# Patient Record
Sex: MALE | Race: WHITE | NOT HISPANIC OR LATINO | Employment: UNEMPLOYED | URBAN - METROPOLITAN AREA
[De-identification: names, ages, dates, MRNs, and addresses within clinical notes are randomized per-mention and may not be internally consistent; named-entity substitution may affect disease eponyms.]

---

## 2023-02-06 ENCOUNTER — APPOINTMENT (OUTPATIENT)
Dept: CT IMAGING | Facility: HOSPITAL | Age: 28
End: 2023-02-06
Payer: MEDICAID

## 2023-02-06 ENCOUNTER — HOSPITAL ENCOUNTER (EMERGENCY)
Facility: HOSPITAL | Age: 28
Discharge: HOME | End: 2023-02-06
Attending: EMERGENCY MEDICINE | Admitting: EMERGENCY MEDICINE
Payer: MEDICAID

## 2023-02-06 VITALS
OXYGEN SATURATION: 98 % | SYSTOLIC BLOOD PRESSURE: 116 MMHG | HEART RATE: 87 BPM | WEIGHT: 315 LBS | RESPIRATION RATE: 16 BRPM | TEMPERATURE: 98.6 F | HEIGHT: 71 IN | BODY MASS INDEX: 44.1 KG/M2 | DIASTOLIC BLOOD PRESSURE: 66 MMHG

## 2023-02-06 DIAGNOSIS — S83.91XA SPRAIN OF RIGHT KNEE, UNSPECIFIED LIGAMENT, INITIAL ENCOUNTER: Primary | ICD-10-CM

## 2023-02-06 DIAGNOSIS — K92.1 HEMATOCHEZIA: ICD-10-CM

## 2023-02-06 LAB
ALBUMIN SERPL-MCNC: 3.8 G/DL (ref 3.5–5.2)
ALBUMIN/GLOB SERPL: 1.2 G/DL
ALP SERPL-CCNC: 85 U/L (ref 39–117)
ALT SERPL W P-5'-P-CCNC: 37 U/L (ref 1–41)
ANION GAP SERPL CALCULATED.3IONS-SCNC: 8.3 MMOL/L (ref 5–15)
AST SERPL-CCNC: 30 U/L (ref 1–40)
BASOPHILS # BLD AUTO: 0.02 10*3/MM3 (ref 0–0.2)
BASOPHILS NFR BLD AUTO: 0.4 % (ref 0–1.5)
BILIRUB SERPL-MCNC: 0.2 MG/DL (ref 0–1.2)
BILIRUB UR QL STRIP: NEGATIVE
BUN SERPL-MCNC: 10 MG/DL (ref 6–20)
BUN/CREAT SERPL: 14.1 (ref 7–25)
CALCIUM SPEC-SCNC: 9.6 MG/DL (ref 8.6–10.5)
CHLORIDE SERPL-SCNC: 101 MMOL/L (ref 98–107)
CLARITY UR: CLEAR
CO2 SERPL-SCNC: 25.7 MMOL/L (ref 22–29)
COLOR UR: YELLOW
CREAT SERPL-MCNC: 0.71 MG/DL (ref 0.76–1.27)
DEPRECATED RDW RBC AUTO: 44.2 FL (ref 37–54)
EGFRCR SERPLBLD CKD-EPI 2021: 128.2 ML/MIN/1.73
EOSINOPHIL # BLD AUTO: 0.22 10*3/MM3 (ref 0–0.4)
EOSINOPHIL NFR BLD AUTO: 4 % (ref 0.3–6.2)
ERYTHROCYTE [DISTWIDTH] IN BLOOD BY AUTOMATED COUNT: 14.2 % (ref 12.3–15.4)
GLOBULIN UR ELPH-MCNC: 3.2 GM/DL
GLUCOSE SERPL-MCNC: 91 MG/DL (ref 65–99)
GLUCOSE UR STRIP-MCNC: NEGATIVE MG/DL
HCT VFR BLD AUTO: 42.6 % (ref 37.5–51)
HEMOCCULT STL QL IA: POSITIVE
HGB BLD-MCNC: 14 G/DL (ref 13–17.7)
HGB UR QL STRIP.AUTO: NEGATIVE
HOLD SPECIMEN: NORMAL
HOLD SPECIMEN: NORMAL
IMM GRANULOCYTES # BLD AUTO: 0.01 10*3/MM3 (ref 0–0.05)
IMM GRANULOCYTES NFR BLD AUTO: 0.2 % (ref 0–0.5)
KETONES UR QL STRIP: NEGATIVE
LEUKOCYTE ESTERASE UR QL STRIP.AUTO: NEGATIVE
LIPASE SERPL-CCNC: 18 U/L (ref 13–60)
LYMPHOCYTES # BLD AUTO: 2.33 10*3/MM3 (ref 0.7–3.1)
LYMPHOCYTES NFR BLD AUTO: 42.4 % (ref 19.6–45.3)
MCH RBC QN AUTO: 27.9 PG (ref 26.6–33)
MCHC RBC AUTO-ENTMCNC: 32.9 G/DL (ref 31.5–35.7)
MCV RBC AUTO: 85 FL (ref 79–97)
MONOCYTES # BLD AUTO: 0.7 10*3/MM3 (ref 0.1–0.9)
MONOCYTES NFR BLD AUTO: 12.8 % (ref 5–12)
NEUTROPHILS NFR BLD AUTO: 2.21 10*3/MM3 (ref 1.7–7)
NEUTROPHILS NFR BLD AUTO: 40.2 % (ref 42.7–76)
NITRITE UR QL STRIP: NEGATIVE
NRBC BLD AUTO-RTO: 0 /100 WBC (ref 0–0.2)
PH UR STRIP.AUTO: 7 [PH] (ref 5–8)
PLATELET # BLD AUTO: 356 10*3/MM3 (ref 140–450)
PMV BLD AUTO: 9.5 FL (ref 6–12)
POTASSIUM SERPL-SCNC: 4.4 MMOL/L (ref 3.5–5.2)
PROT SERPL-MCNC: 7 G/DL (ref 6–8.5)
PROT UR QL STRIP: NEGATIVE
RBC # BLD AUTO: 5.01 10*6/MM3 (ref 4.14–5.8)
SODIUM SERPL-SCNC: 135 MMOL/L (ref 136–145)
SP GR UR STRIP: 1.01 (ref 1–1.03)
UROBILINOGEN UR QL STRIP: NORMAL
WBC NRBC COR # BLD: 5.49 10*3/MM3 (ref 3.4–10.8)
WHOLE BLOOD HOLD COAG: NORMAL
WHOLE BLOOD HOLD SPECIMEN: NORMAL

## 2023-02-06 PROCEDURE — 80053 COMPREHEN METABOLIC PANEL: CPT | Performed by: EMERGENCY MEDICINE

## 2023-02-06 PROCEDURE — 36415 COLL VENOUS BLD VENIPUNCTURE: CPT

## 2023-02-06 PROCEDURE — 74177 CT ABD & PELVIS W/CONTRAST: CPT

## 2023-02-06 PROCEDURE — 99283 EMERGENCY DEPT VISIT LOW MDM: CPT

## 2023-02-06 PROCEDURE — 83690 ASSAY OF LIPASE: CPT | Performed by: EMERGENCY MEDICINE

## 2023-02-06 PROCEDURE — 81003 URINALYSIS AUTO W/O SCOPE: CPT | Performed by: EMERGENCY MEDICINE

## 2023-02-06 PROCEDURE — 82274 ASSAY TEST FOR BLOOD FECAL: CPT | Performed by: EMERGENCY MEDICINE

## 2023-02-06 PROCEDURE — 85025 COMPLETE CBC W/AUTO DIFF WBC: CPT | Performed by: EMERGENCY MEDICINE

## 2023-02-06 PROCEDURE — 0 IOPAMIDOL PER 1 ML: Performed by: EMERGENCY MEDICINE

## 2023-02-06 RX ORDER — DOCUSATE SODIUM 100 MG/1
100 CAPSULE, LIQUID FILLED ORAL 2 TIMES DAILY
Qty: 60 CAPSULE | Refills: 0 | Status: SHIPPED | OUTPATIENT
Start: 2023-02-06 | End: 2023-03-08

## 2023-02-06 RX ORDER — QUETIAPINE FUMARATE 100 MG/1
100 TABLET, FILM COATED ORAL NIGHTLY
COMMUNITY

## 2023-02-06 RX ORDER — PRAZOSIN HYDROCHLORIDE 2 MG/1
3 CAPSULE ORAL NIGHTLY
COMMUNITY

## 2023-02-06 RX ORDER — LISINOPRIL 20 MG/1
20 TABLET ORAL DAILY
COMMUNITY

## 2023-02-06 RX ORDER — BUPRENORPHINE HYDROCHLORIDE AND NALOXONE HYDROCHLORIDE DIHYDRATE 8; 2 MG/1; MG/1
2 TABLET SUBLINGUAL DAILY
COMMUNITY

## 2023-02-06 RX ORDER — DOCUSATE SODIUM 100 MG/1
100 CAPSULE, LIQUID FILLED ORAL 2 TIMES DAILY
Qty: 60 CAPSULE | Refills: 0 | Status: SHIPPED | OUTPATIENT
Start: 2023-02-06 | End: 2023-02-06 | Stop reason: SDUPTHER

## 2023-02-06 RX ORDER — SODIUM CHLORIDE 0.9 % (FLUSH) 0.9 %
10 SYRINGE (ML) INJECTION AS NEEDED
Status: DISCONTINUED | OUTPATIENT
Start: 2023-02-06 | End: 2023-02-06 | Stop reason: HOSPADM

## 2023-02-06 RX ORDER — BUPROPION HYDROCHLORIDE 150 MG/1
300 TABLET, EXTENDED RELEASE ORAL DAILY
COMMUNITY

## 2023-02-06 RX ADMIN — IOPAMIDOL 100 ML: 755 INJECTION, SOLUTION INTRAVENOUS at 11:58
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
## 2023-02-16 ENCOUNTER — HOSPITAL ENCOUNTER (EMERGENCY)
Facility: HOSPITAL | Age: 28
Discharge: HOME | End: 2023-02-16
Attending: EMERGENCY MEDICINE | Admitting: EMERGENCY MEDICINE
Payer: MEDICAID

## 2023-02-16 VITALS
HEIGHT: 71 IN | HEART RATE: 91 BPM | OXYGEN SATURATION: 100 % | SYSTOLIC BLOOD PRESSURE: 141 MMHG | TEMPERATURE: 97.9 F | RESPIRATION RATE: 20 BRPM | WEIGHT: 315 LBS | DIASTOLIC BLOOD PRESSURE: 76 MMHG | BODY MASS INDEX: 44.1 KG/M2

## 2023-02-16 DIAGNOSIS — J10.1 INFLUENZA B: Primary | ICD-10-CM

## 2023-02-16 LAB
FLUAV AG NPH QL: NEGATIVE
FLUBV AG NPH QL IA: POSITIVE
S PYO AG THROAT QL: NEGATIVE
SARS-COV-2 RNA RESP QL NAA+PROBE: NOT DETECTED

## 2023-02-16 PROCEDURE — 87147 CULTURE TYPE IMMUNOLOGIC: CPT | Performed by: EMERGENCY MEDICINE

## 2023-02-16 PROCEDURE — 87081 CULTURE SCREEN ONLY: CPT | Performed by: EMERGENCY MEDICINE

## 2023-02-16 PROCEDURE — C9803 HOPD COVID-19 SPEC COLLECT: HCPCS

## 2023-02-16 PROCEDURE — U0004 COV-19 TEST NON-CDC HGH THRU: HCPCS

## 2023-02-16 PROCEDURE — C9803 HOPD COVID-19 SPEC COLLECT: HCPCS | Performed by: EMERGENCY MEDICINE

## 2023-02-16 PROCEDURE — 87804 INFLUENZA ASSAY W/OPTIC: CPT

## 2023-02-16 PROCEDURE — 87880 STREP A ASSAY W/OPTIC: CPT

## 2023-02-16 PROCEDURE — 99283 EMERGENCY DEPT VISIT LOW MDM: CPT
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
## 2023-02-16 NOTE — DISCHARGE INSTRUCTIONS
Drink plenty of fluids, rest, take OTC tylenol/ibuprofen as needed for headache/body aches/fever.   You can take over-the-counter cold and flu medicine as needed    It is a virus, will need to run its course

## 2023-02-18 ENCOUNTER — TELEPHONE (OUTPATIENT)
Dept: EMERGENCY DEPT | Facility: HOSPITAL | Age: 28
End: 2023-02-18
Payer: MEDICAID

## 2023-02-18 LAB — BACTERIA SPEC AEROBE CULT: ABNORMAL

## 2023-02-18 NOTE — TELEPHONE ENCOUNTER
Attempted phone call to discuss results of recent throat culture.  Unable to leave voicemail.  There was no answer.

## 2023-04-27 ENCOUNTER — OFFICE VISIT (OUTPATIENT)
Dept: FAMILY MEDICINE CLINIC | Facility: CLINIC | Age: 28
End: 2023-04-27
Payer: MEDICAID

## 2023-04-27 ENCOUNTER — LAB (OUTPATIENT)
Dept: LAB | Facility: HOSPITAL | Age: 28
End: 2023-04-27
Payer: MEDICAID

## 2023-04-27 VITALS
HEART RATE: 89 BPM | OXYGEN SATURATION: 98 % | DIASTOLIC BLOOD PRESSURE: 90 MMHG | WEIGHT: 315 LBS | BODY MASS INDEX: 44.1 KG/M2 | HEIGHT: 71 IN | SYSTOLIC BLOOD PRESSURE: 130 MMHG

## 2023-04-27 DIAGNOSIS — F32.A DEPRESSION, UNSPECIFIED DEPRESSION TYPE: ICD-10-CM

## 2023-04-27 DIAGNOSIS — E66.01 CLASS 3 SEVERE OBESITY DUE TO EXCESS CALORIES WITH SERIOUS COMORBIDITY AND BODY MASS INDEX (BMI) OF 45.0 TO 49.9 IN ADULT: ICD-10-CM

## 2023-04-27 DIAGNOSIS — Z13.29 SCREENING FOR ENDOCRINE DISORDER: ICD-10-CM

## 2023-04-27 DIAGNOSIS — I10 PRIMARY HYPERTENSION: Primary | ICD-10-CM

## 2023-04-27 DIAGNOSIS — Z13.0 SCREENING FOR DEFICIENCY ANEMIA: ICD-10-CM

## 2023-04-27 DIAGNOSIS — G89.29 CHRONIC RIGHT HIP PAIN: ICD-10-CM

## 2023-04-27 DIAGNOSIS — F43.12 CHRONIC POST-TRAUMATIC STRESS DISORDER (PTSD): ICD-10-CM

## 2023-04-27 DIAGNOSIS — F31.9 BIPOLAR I DISORDER: ICD-10-CM

## 2023-04-27 DIAGNOSIS — Z11.59 NEED FOR HEPATITIS C SCREENING TEST: ICD-10-CM

## 2023-04-27 DIAGNOSIS — H69.83 EUSTACHIAN TUBE DYSFUNCTION, BILATERAL: ICD-10-CM

## 2023-04-27 DIAGNOSIS — M25.561 CHRONIC PAIN OF RIGHT KNEE: ICD-10-CM

## 2023-04-27 DIAGNOSIS — G89.29 CHRONIC PAIN OF RIGHT KNEE: ICD-10-CM

## 2023-04-27 DIAGNOSIS — M25.551 CHRONIC RIGHT HIP PAIN: ICD-10-CM

## 2023-04-27 DIAGNOSIS — Z13.220 SCREENING FOR HYPERLIPIDEMIA: ICD-10-CM

## 2023-04-27 LAB
ALBUMIN SERPL-MCNC: 4.1 G/DL (ref 3.5–5.2)
ALBUMIN/GLOB SERPL: 1.3 G/DL
ALP SERPL-CCNC: 85 U/L (ref 39–117)
ALT SERPL W P-5'-P-CCNC: 41 U/L (ref 1–41)
ANION GAP SERPL CALCULATED.3IONS-SCNC: 7.4 MMOL/L (ref 5–15)
AST SERPL-CCNC: 20 U/L (ref 1–40)
BILIRUB SERPL-MCNC: 0.2 MG/DL (ref 0–1.2)
BILIRUB UR QL STRIP: NEGATIVE
BUN SERPL-MCNC: 14 MG/DL (ref 6–20)
BUN/CREAT SERPL: 16.7 (ref 7–25)
CALCIUM SPEC-SCNC: 9.7 MG/DL (ref 8.6–10.5)
CHLORIDE SERPL-SCNC: 103 MMOL/L (ref 98–107)
CHOLEST SERPL-MCNC: 140 MG/DL (ref 0–200)
CLARITY UR: CLEAR
CO2 SERPL-SCNC: 25.6 MMOL/L (ref 22–29)
COLOR UR: YELLOW
CREAT SERPL-MCNC: 0.84 MG/DL (ref 0.76–1.27)
DEPRECATED RDW RBC AUTO: 38.6 FL (ref 37–54)
EGFRCR SERPLBLD CKD-EPI 2021: 121.8 ML/MIN/1.73
ERYTHROCYTE [DISTWIDTH] IN BLOOD BY AUTOMATED COUNT: 12.7 % (ref 12.3–15.4)
GLOBULIN UR ELPH-MCNC: 3.2 GM/DL
GLUCOSE SERPL-MCNC: 104 MG/DL (ref 65–99)
GLUCOSE UR STRIP-MCNC: NEGATIVE MG/DL
HBA1C MFR BLD: 5.3 % (ref 4.8–5.6)
HCT VFR BLD AUTO: 45.6 % (ref 37.5–51)
HCV AB SER DONR QL: REACTIVE
HDLC SERPL-MCNC: 32 MG/DL (ref 40–60)
HGB BLD-MCNC: 14.7 G/DL (ref 13–17.7)
HGB UR QL STRIP.AUTO: NEGATIVE
KETONES UR QL STRIP: NEGATIVE
LDLC SERPL CALC-MCNC: 90 MG/DL (ref 0–100)
LDLC/HDLC SERPL: 2.78 {RATIO}
LEUKOCYTE ESTERASE UR QL STRIP.AUTO: NEGATIVE
MCH RBC QN AUTO: 27.2 PG (ref 26.6–33)
MCHC RBC AUTO-ENTMCNC: 32.2 G/DL (ref 31.5–35.7)
MCV RBC AUTO: 84.3 FL (ref 79–97)
NITRITE UR QL STRIP: NEGATIVE
PH UR STRIP.AUTO: 6 [PH] (ref 5–8)
PLATELET # BLD AUTO: 405 10*3/MM3 (ref 140–450)
PMV BLD AUTO: 10.1 FL (ref 6–12)
POTASSIUM SERPL-SCNC: 4.4 MMOL/L (ref 3.5–5.2)
PROT SERPL-MCNC: 7.3 G/DL (ref 6–8.5)
PROT UR QL STRIP: NEGATIVE
RBC # BLD AUTO: 5.41 10*6/MM3 (ref 4.14–5.8)
SODIUM SERPL-SCNC: 136 MMOL/L (ref 136–145)
SP GR UR STRIP: 1.02 (ref 1–1.03)
TRIGL SERPL-MCNC: 96 MG/DL (ref 0–150)
TSH SERPL DL<=0.05 MIU/L-ACNC: 2.82 UIU/ML (ref 0.27–4.2)
UROBILINOGEN UR QL STRIP: NORMAL
VLDLC SERPL-MCNC: 18 MG/DL (ref 5–40)
WBC NRBC COR # BLD: 7.07 10*3/MM3 (ref 3.4–10.8)

## 2023-04-27 PROCEDURE — 80050 GENERAL HEALTH PANEL: CPT | Performed by: FAMILY MEDICINE

## 2023-04-27 PROCEDURE — 80061 LIPID PANEL: CPT | Performed by: FAMILY MEDICINE

## 2023-04-27 PROCEDURE — 81003 URINALYSIS AUTO W/O SCOPE: CPT | Performed by: FAMILY MEDICINE

## 2023-04-27 PROCEDURE — 83036 HEMOGLOBIN GLYCOSYLATED A1C: CPT | Performed by: FAMILY MEDICINE

## 2023-04-27 PROCEDURE — 86803 HEPATITIS C AB TEST: CPT | Performed by: FAMILY MEDICINE

## 2023-04-27 RX ORDER — QUETIAPINE FUMARATE 200 MG/1
200 TABLET, FILM COATED ORAL NIGHTLY
Qty: 90 TABLET | Refills: 0 | Status: SHIPPED | OUTPATIENT
Start: 2023-04-27

## 2023-04-27 RX ORDER — BUPROPION HYDROCHLORIDE 300 MG/1
300 TABLET ORAL DAILY
Qty: 90 TABLET | Refills: 0 | Status: SHIPPED | OUTPATIENT
Start: 2023-04-27

## 2023-04-27 RX ORDER — PRAZOSIN HYDROCHLORIDE 5 MG/1
5 CAPSULE ORAL NIGHTLY
Qty: 90 CAPSULE | Refills: 0 | Status: SHIPPED | OUTPATIENT
Start: 2023-04-27

## 2023-04-27 RX ORDER — FLUTICASONE PROPIONATE 50 MCG
1 SPRAY, SUSPENSION (ML) NASAL DAILY
Qty: 16 G | Refills: 11 | Status: SHIPPED | OUTPATIENT
Start: 2023-04-27

## 2023-04-27 RX ORDER — QUETIAPINE FUMARATE 200 MG/1
200 TABLET, FILM COATED ORAL NIGHTLY
COMMUNITY
End: 2023-04-27 | Stop reason: SDUPTHER

## 2023-04-27 RX ORDER — LISINOPRIL 20 MG/1
20 TABLET ORAL DAILY
Qty: 90 TABLET | Refills: 0 | Status: SHIPPED | OUTPATIENT
Start: 2023-04-27

## 2023-04-27 RX ORDER — BUPROPION HYDROCHLORIDE 300 MG/1
300 TABLET ORAL DAILY
COMMUNITY
End: 2023-04-27 | Stop reason: SDUPTHER

## 2023-04-27 NOTE — PATIENT INSTRUCTIONS
"Dr. Little' Weight Loss Tips and Recommendations:    Most important tips to remember  1) Track your food and weight every day. It keeps you accountable to yourself and gives an accurate picture.  2) Exercise of at least 10 minutes daily is important, but it can be undone quickly with diet. Running 3 miles burns about the number of calories in 1 (one) chocolate chip cookie.  3) Stop drinking calories. This is the number one source of weight gain. Juice is as bad as soda. One 20oz bottle of pop is 250 calories. Drinking one daily equals roughly 25 pounds of weight gain every year just in pop. Our bodies also don't recognize liquid calories well--our hunger mechanism is entirely unlinked from our thirst mechanism. Stick with water.  4) Weight loss will be slow. The changes you make will feel small, and the journey will seem interminable. A mantra that I find helpful: \"At the end of the day I am just an ordinary person who does a whole bunch of tiny, ordinary things that together are extraordinary.\"  5) Don't link your food to what others eat. Someone who weighs 180 pounds who loses 30 pounds will need to eat 20% fewer calories to stay at that weight than someone who weighed 150 ponds all along. This never goes away, and is why childhood and adolescent health is so important. 20% may not sound like much, but that's the difference between staying that weight and rebounding, and informs why fad diets so often fail--whatever you did to lose the weight, you have to do to keep it off.  6) At the end of the day, it comes down to: Eat a little less, and move a little more. That's it.    Media for Motivation  Movies:  Angelina Runs a Marathon (Amazon Prime Video)  The Weight of the Nation ([employer], Sonya, also in 4 parts on YouTube)    Books:  Born to Run - by Chip Askew  The Terrible and Wonderful Reasons Why I Run Long Distances - by Compa Alarcon (Also online at https://Konnecti.com.trustedsafe/[place]/running)    Other tips  Set " "discrete, achievable, actionable goals. This means focusing on specific successes that are within your control, rather than short-term results.  Just remember, that if you do the right things, do them consistently, and do them for the long-term, you will lose weight.  Make sure your goals are \"ADA\" compliant--Achievable, Discrete, and Actionable:  Achievable: It is important to be realistic.  Setting unrealistic expectations not only setting up for failure, but can even be unhealthy.  Start small with changes that will be sustainable over the long-term.  Remember, how you eat and exercise to get to a certain weight is how you'll need to eat and exercise to stay at that weight.  Discrete: Rather than say \"I want to eat better\", which is vague and noncommittal, make your goal something like \"I want to eat 3 servings of vegetables every day\" or \"I want to keep every meal below 600 shaniqua\"  Actionable: Actual weight makes small fluctuations on a daily basis, impacted by things such as water retention or colonic waste retention.  For the most part, these things are out of our control.  It can be easy to do everything right but then have a little fluid retention or constipation, and suddenly despite all your efforts your weight remains the same. So instead of trying to lose say 2 pounds per week, make your goals only dependent on what you do--\"I want to walk 10,000 steps per day\" or \"I want to eat below 1500 shaniqua per day.\"  That way you can have weekly successes and don't get to down on yourself for things that you don't control.  For weight loss, weigh yourself regularly, on an empty stomach in the morning (or at least not within 8 hours of a heavy meal). It is also helpful to weigh yourself in the same clothes every time -- this might even include for every time you come to see me! Write down or track your weight somehow to maintain accountability. It may also be helpful to track how your clothes fit and specific " measurements of waist circumference or thigh circumference.  Try to keep a log of your food and eat 500-700 calories fewer than your calculated needs per day. Use a notebook or faye (see apps below) If you stick to this amount, regardless of what the scale fluctuates to, I can guarantee that you will lose weight in the form of fat. One pound of fat is approximately 3500 calories, which means cutting 500 calories per day will result in about a pound of fat loss. Continue to try to exercise and walk as much as possible.   Fidgeting, as much as we're taught not to, can burn an extra 100-200 calories per day as well!   If you stress-eat and occasionally eat impulsively, always make sure to keep a healthy snack and water on hand for when these impulses strike. A handful of almonds and 8 ounces of water are a great low-calorie, high-protein combination that controls hunger well.  In general, when you are eating, focus on eating--don't eat in bed, or while watching TV, or while at your desk. Engage with your food, and why you are eating it.  If your diet includes salads, try for small changes such as swapping spinach or kale for chandan or iceberg lettuce. Try to choose dressings that are either low in fat or that have a high ratio of unsaturated to saturated fats. These include ones with olive oil rather than soybean oil bases.    Useful Apps  MyFitnessPal (best overall for tracking- includes food database, barcode scanning, weight tracking with photo capability, and can pull data from other apps and devices)  Social Insight ([employer] Smart Device tracking)  [employer]    Helpful Websites  https://www.Innovashop.tv.[employer]/fitness/resources/weight-loss-calculator       (How much do I have to do to lose weight by a certain date?)  http://www.calculator.net/calorie-calculator.html          (Caloric Need Calculator)  https://www.cdc.gov/healthyweight/assessing/bmi/adult_bmi/english_bmi_calculator/bmi_calculator.html   (BMI Calculator + Ideal Weight)    OTC Meds for Weight Loss  Jabier - $65 for 30-day supply (120-ct. 60mg capsules) - Available at Amazon and most drug stores  A reduced-dosage version of the prescription medication Orlistat (Xenical). Taken 1 capsule 3-4 times daily with food. It works by stopping an enzyme that digests fat, decreasing the amount of fat (and calories) your body absorbs from food. Side effects go along with undigested fat and include bloating, gas, greasy/oily stools. Weight loss is modest and achieved by calorie absorption reduction as well as diet modification through aversion of side effects (to put a blunt point on it--no one wants to poop their pants with pizza grease, so you avoid the foods that do that to you). I recommend liver enzyme monitoring if taking.  I do not recommend any other OTC products or supplements for weight loss including stimulants
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
## 2023-05-01 ENCOUNTER — TELEPHONE (OUTPATIENT)
Dept: FAMILY MEDICINE CLINIC | Facility: CLINIC | Age: 28
End: 2023-05-01
Payer: MEDICAID

## 2023-05-01 DIAGNOSIS — R76.8 HEPATITIS C ANTIBODY POSITIVE IN BLOOD: Primary | ICD-10-CM

## 2023-05-01 NOTE — TELEPHONE ENCOUNTER
Contacted pt & relayed PCP's message. Pt wanted to make PCP aware that  he had it back in 2017 and he went for treatment however once he got to his appt he was told his body had fought it off. Pt will return to lab for repeat this week. Lab hours given.

## 2023-05-01 NOTE — TELEPHONE ENCOUNTER
He will need to return to [facility] for a test for active virus in the bloodstream. He may have had previous exposure and cleared the infection, but we need to make sure he does not have active hepatitis C.

## 2023-05-02 ENCOUNTER — LAB (OUTPATIENT)
Dept: LAB | Facility: HOSPITAL | Age: 28
End: 2023-05-02
Payer: MEDICAID

## 2023-05-02 DIAGNOSIS — R76.8 HEPATITIS C ANTIBODY POSITIVE IN BLOOD: ICD-10-CM

## 2023-05-02 PROCEDURE — 87522 HEPATITIS C REVRS TRNSCRPJ: CPT

## 2023-05-05 LAB
HCV GENTYP SERPL NAA+PROBE: NORMAL
HCV RNA SERPL NAA+PROBE-ACNC: NORMAL IU/ML
HCV RNA SERPL NAA+PROBE-LOG IU: NORMAL LOG10 IU/ML
LABORATORY COMMENT REPORT: NORMAL